# Patient Record
(demographics unavailable — no encounter records)

---

## 2025-06-18 NOTE — DATA REVIEWED
[de-identified] : Audiogram personally reviewed and interpreted and my findings are as follows (6/18/25): Right: SRT 45dB; WRS 90%; Type A tymp; mild down to mod-severe SNHL Left: SRT 45dB; %; Type A tymp; mild down to mod-severe SNHL

## 2025-06-18 NOTE — HISTORY OF PRESENT ILLNESS
[de-identified] : 69F who presents with bilateral chronic progressive hearing loss for the past few years. No otalgia, otorrhea, vertigo, tinnitus, acute hearing changes. No hx of ear surgery nor ear infections. She endorses bilateral ear pain that comes and goes. She does not grind or clench her teeth at night. No nasal or throat complaints.

## 2025-06-18 NOTE — ASSESSMENT
[FreeTextEntry1] : SNHL - 1 chronic illness with progression - bilateral symmetric mild down to mod-severe SNHL - audiogram reviewed with patient - discussed connection between hearing loss and dementia/cognitive decline - hearing aid eval with outside dispensary due to insurance - f/u in 1 year for repeat audiogram  TMJ - 1 chronic illness - discussed the treatment options for TMJ - warm compresses - massage - soft diet for 3 weeks - ibuprofen 600mg three times a day for three weeks - The potential side effects of ibuprofen were discussed at length with the patient which include but are not limited to: stomach pain, constipation, diarrhea, gas, heartburn, nausea, vomiting, dizziness, headache, rash, bleeding - Patient instructed to take ibuprofen with a meal to help prevent stomach ulcers/bleeding